# Patient Record
Sex: MALE | Race: WHITE
[De-identification: names, ages, dates, MRNs, and addresses within clinical notes are randomized per-mention and may not be internally consistent; named-entity substitution may affect disease eponyms.]

---

## 2022-03-28 ENCOUNTER — TRANSCRIBE ORDERS (OUTPATIENT)
Dept: OTHER | Age: 57
End: 2022-03-28

## 2022-03-28 DIAGNOSIS — Z01.00 VISIT FOR EYE AND VISION EXAM: Primary | ICD-10-CM

## 2022-04-25 ENCOUNTER — DOCUMENTATION ONLY (OUTPATIENT)
Dept: OPTOMETRY | Facility: CLINIC | Age: 57
End: 2022-04-25

## 2022-04-25 ENCOUNTER — OFFICE VISIT (OUTPATIENT)
Dept: OPTOMETRY | Facility: CLINIC | Age: 57
End: 2022-04-25
Payer: COMMERCIAL

## 2022-04-25 DIAGNOSIS — H52.223 REGULAR ASTIGMATISM OF BOTH EYES: ICD-10-CM

## 2022-04-25 DIAGNOSIS — Z01.00 EXAMINATION OF EYES AND VISION: Primary | ICD-10-CM

## 2022-04-25 DIAGNOSIS — H52.4 PRESBYOPIA: ICD-10-CM

## 2022-04-25 DIAGNOSIS — H52.13 MYOPIA OF BOTH EYES: ICD-10-CM

## 2022-04-25 PROCEDURE — 92015 DETERMINE REFRACTIVE STATE: CPT | Performed by: OPTOMETRIST

## 2022-04-25 PROCEDURE — 92004 COMPRE OPH EXAM NEW PT 1/>: CPT | Performed by: OPTOMETRIST

## 2022-04-25 ASSESSMENT — VISUAL ACUITY
OS_CC: 20/40
OD_CC: 20/40
OD_CC: 20/50
OD_SC: 20/400-
OS_SC: 20/400
OD_CC+: -1
OD_PH_CC+: -1
METHOD: SNELLEN - LINEAR
OS_CC: 20/40
OS_PH_CC: 20/30
CORRECTION_TYPE: GLASSES
OD_PH_CC: 20/25
OS_CC+: -1

## 2022-04-25 ASSESSMENT — REFRACTION_MANIFEST
OS_CYLINDER: +1.50
OD_ADD: +2.00
OD_AXIS: 035
OD_SPHERE: -4.50
OS_ADD: +2.00
OS_SPHERE: -5.50
OD_CYLINDER: +0.75
OS_AXIS: 170

## 2022-04-25 ASSESSMENT — KERATOMETRY
OS_K2POWER_DIOPTERS: 43.25
OD_K1POWER_DIOPTERS: 41.50
OS_AXISANGLE2_DEGREES: 075
OD_AXISANGLE2_DEGREES: 102
OS_AXISANGLE_DEGREES: 165
OD_K2POWER_DIOPTERS: 42.50
OD_AXISANGLE_DEGREES: 012
OS_K1POWER_DIOPTERS: 41.75

## 2022-04-25 ASSESSMENT — EXTERNAL EXAM - RIGHT EYE: OD_EXAM: NORMAL

## 2022-04-25 ASSESSMENT — SLIT LAMP EXAM - LIDS
COMMENTS: NORMAL
COMMENTS: NORMAL

## 2022-04-25 ASSESSMENT — REFRACTION_WEARINGRX
OS_CYLINDER: +1.00
OS_SPHERE: -5.00
OD_CYLINDER: +1.00
OS_AXIS: 170
OD_ADD: +2.00
SPECS_TYPE: BIFOCAL
OS_ADD: +2.00
OD_AXIS: 005
OD_SPHERE: -4.00

## 2022-04-25 ASSESSMENT — TONOMETRY
OD_IOP_MMHG: 15
IOP_METHOD: TONOPEN
OS_IOP_MMHG: 14

## 2022-04-25 ASSESSMENT — CONF VISUAL FIELD
OS_NORMAL: 1
OD_NORMAL: 1

## 2022-04-25 ASSESSMENT — EXTERNAL EXAM - LEFT EYE: OS_EXAM: NORMAL

## 2022-04-25 ASSESSMENT — CUP TO DISC RATIO
OD_RATIO: 0.15
OS_RATIO: 0.15

## 2022-04-25 NOTE — PATIENT INSTRUCTIONS
Eyeglass prescription given.  I recommend a trifocal lens to give you extended range of vision.    Return in 1 year for a complete eye exam or sooner if needed.    Mina Butts, OD    The affects of the dilating drops last for 4- 6 hours.  You will be more sensitive to light and vision will be blurry up close.  Do not drive if you do not feel comfortable.  Mydriatic sunglasses were given if needed.      Optometry Providers       Clinic Locations                                 Telephone Number   Dr. Mehnaz Garcia    [place]   [facility]  [place] [phone]     [facility] [facility] Hours:                [place] [facility] Hours:       [facility] Hours:   [address]   [address]      [address] [place] [address]   [place] [address]    [place] [address]  Phone: [phone]                    Phone: [phone]     Phone: [phone]                      Monday 8:00-6:00                          Monday 8:00-6:00                          Monday 8:00-6:00              Tuesday 8:00-6:00                          Tuesday 8:00-6:00                          Tuesday 8:00-6:00              Wednesday 8:00-6:00                  Wednesday 8:00-6:00                   Wednesday 8:00-6:00      Thursday 8:00-6:00                        Thursday 8:00-6:00                         Thursday 8:00-6:00            Friday 8:00-5:00                              Friday 8:00-5:00                              Friday 8:00-5:00    [facility] Hours:   [address] [place] [address]  [phone]    Monday 9:00-6:00  Tuesday 9:00-6:00  Wednesday 9:00-6:00  Thursday 9:00-6:00  Friday 9:00-5:00  Please log on to [email or web address] to order your contact lenses.  The link is found on the [facility] page.  As always, Thank you for trusting us with your health care needs!

## 2022-04-25 NOTE — Clinical Note
4/25/2022         RE: Ra Saenz  [address]  [facility] [address]        Dear Colleague,    Thank you for referring your patient, Ra Saenz, to the [facility]. Please see a copy of my visit note below.    Chief Complaint   Patient presents with     Annual Eye Exam      Accompanied by self  Last Eye Exam: 4 years  Dilated Previously: Yes    What are you currently using to see?  glasses     Distance Vision Acuity: Noticed gradual change in both eyes    Near Vision Acuity: Not satisfied     Eye Comfort: good  Do you use eye drops? 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    Vero Bae Optometric Assistant, A.B.O.C.      Medical, surgical and family histories reviewed and updated 4/25/2022.       OBJECTIVE: See Ophthalmology exam    ASSESSMENT:    ICD-10-CM    1. Examination of eyes and vision  Z01.00 EYE EXAM (SIMPLE-NONBILLABLE)   2. Myopia of both eyes  H52.13 REFRACTION   3. Regular astigmatism of both eyes  H52.223 REFRACTION   4. Presbyopia  H52.4 REFRACTION       PLAN:     Patient Instructions   Eyeglass prescription given.  I recommend a trifocal lens to give you extended range of vision.    Return in 1 year for a complete eye exam or sooner if needed.    Mina Butts, STEVIE           Again, thank you for allowing me to participate in the care of your patient.        Sincerely,        Mina Butts, OD

## 2022-04-25 NOTE — PROGRESS NOTES
Chief Complaint   Patient presents with     Annual Eye Exam      Accompanied by self  Last Eye Exam: 4 years  Dilated Previously: Yes    What are you currently using to see?  glasses     Distance Vision Acuity: Noticed gradual change in both eyes    Near Vision Acuity: Not satisfied     Eye Comfort: good  Do you use eye drops? 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    Vero Bae Optometric Assistant, A.B.O.C.      Medical, surgical and family histories reviewed and updated 4/25/2022.       OBJECTIVE: See Ophthalmology exam    ASSESSMENT:    ICD-10-CM    1. Examination of eyes and vision  Z01.00 EYE EXAM (SIMPLE-NONBILLABLE)   2. Myopia of both eyes  H52.13 REFRACTION   3. Regular astigmatism of both eyes  H52.223 REFRACTION   4. Presbyopia  H52.4 REFRACTION       PLAN:     Patient Instructions   Eyeglass prescription given.  I recommend a trifocal lens to give you extended range of vision.    Return in 1 year for a complete eye exam or sooner if needed.    Mina Butts, OD